# Patient Record
Sex: MALE | Race: WHITE | URBAN - METROPOLITAN AREA
[De-identification: names, ages, dates, MRNs, and addresses within clinical notes are randomized per-mention and may not be internally consistent; named-entity substitution may affect disease eponyms.]

---

## 2017-12-04 ENCOUNTER — OFFICE VISIT (OUTPATIENT)
Dept: FAMILY MEDICINE | Facility: CLINIC | Age: 57
End: 2017-12-04
Payer: COMMERCIAL

## 2017-12-04 ENCOUNTER — TELEPHONE (OUTPATIENT)
Dept: FAMILY MEDICINE | Facility: CLINIC | Age: 57
End: 2017-12-04

## 2017-12-04 VITALS
HEIGHT: 74 IN | HEART RATE: 76 BPM | BODY MASS INDEX: 23.36 KG/M2 | DIASTOLIC BLOOD PRESSURE: 78 MMHG | SYSTOLIC BLOOD PRESSURE: 116 MMHG | WEIGHT: 182 LBS

## 2017-12-04 DIAGNOSIS — K13.0 ANGULAR CHEILITIS: ICD-10-CM

## 2017-12-04 DIAGNOSIS — K13.0 ANGULAR CHEILITIS: Primary | ICD-10-CM

## 2017-12-04 PROCEDURE — 99203 OFFICE O/P NEW LOW 30 MIN: CPT | Performed by: INTERNAL MEDICINE

## 2017-12-04 RX ORDER — PRENATAL VIT 91/IRON/FOLIC/DHA 28-975-200
COMBINATION PACKAGE (EA) ORAL 2 TIMES DAILY
COMMUNITY

## 2017-12-04 RX ORDER — BENZOCAINE/MENTHOL 6 MG-10 MG
LOZENGE MUCOUS MEMBRANE 2 TIMES DAILY
COMMUNITY

## 2017-12-04 RX ORDER — TRIAMCINOLONE ACETONIDE 1 MG/G
CREAM TOPICAL
Qty: 30 G | Refills: 3 | Status: SHIPPED | OUTPATIENT
Start: 2017-12-04 | End: 2020-02-28

## 2017-12-04 RX ORDER — ZINC OXIDE
OINTMENT (GRAM) TOPICAL PRN
Qty: 30 G | Refills: 3 | Status: SHIPPED | OUTPATIENT
Start: 2017-12-04 | End: 2017-12-05

## 2017-12-04 RX ORDER — NYSTATIN 100000 U/G
CREAM TOPICAL 3 TIMES DAILY
Qty: 30 G | Refills: 3 | Status: SHIPPED | OUTPATIENT
Start: 2017-12-04 | End: 2017-12-18

## 2017-12-04 NOTE — MR AVS SNAPSHOT
"              After Visit Summary   12/4/2017    Will Dempsey    MRN: [id number]           Patient Information     Date Of Birth          1960        Visit Information        Provider Department      12/4/2017 4:40 PM Ender Turner MD [facility]        Today's Diagnoses     Angular cheilitis    -  1      Care Instructions    Mix equal parts of the zinc oxide, nystatin, and triamcinolone is a tuperware dish with lid.  Place on area 2-4 times daily.    Take the zinc supplement for 4 weeks    Call me if not improved in 3 weeks            Follow-ups after your visit        Who to contact     If you have questions or need follow up information about today's clinic visit or your schedule please contact [facility] directly at [phone].  Normal or non-critical lab and imaging results will be communicated to you by MyChart, letter or phone within 4 business days after the clinic has received the results. If you do not hear from us within 7 days, please contact the clinic through MyChart or phone. If you have a critical or abnormal lab result, we will notify you by phone as soon as possible.  Submit refill requests through [employer] or call your pharmacy and they will forward the refill request to us. Please allow 3 business days for your refill to be completed.          Additional Information About Your Visit        MyChart Information     [employer] lets you send messages to your doctor, view your test results, renew your prescriptions, schedule appointments and more. To sign up, go to [email or web address] . Click on \"Log in\" on the left side of the screen, which will take you to the Welcome page. Then click on \"Sign up Now\" on the right side of the page.     You will be asked to enter the access code listed below, as well as some personal information. Please follow the directions to create your username and password.     Your access code is: [email or web address]  Expires: " "3/4/2018  5:33 PM     Your access code will  in 90 days. If you need help or a new code, please call your [place] clinic or [phone].        Care EveryWhere ID     This is your Care EveryWhere ID. This could be used by other organizations to access your [place] medical records  [id number]        Your Vitals Were     Pulse Height BMI (Body Mass Index)             76 6' 2\" (1.88 m) 23.37 kg/m2          Blood Pressure from Last 3 Encounters:   17 116/78    Weight from Last 3 Encounters:   17 182 lb (82.6 kg)              Today, you had the following     No orders found for display         Today's Medication Changes          These changes are accurate as of: 17  5:33 PM.  If you have any questions, ask your nurse or doctor.               Start taking these medicines.        Dose/Directions    nystatin cream   Commonly known as:  MYCOSTATIN   Used for:  Angular cheilitis   Started by:  Ender Turner MD        Apply topically 3 times daily for 14 days   Quantity:  30 g   Refills:  3       triamcinolone 0.1 % cream   Commonly known as:  KENALOG   Used for:  Angular cheilitis   Started by:  Ender Turner MD        Apply sparingly to affected area three times daily as needed   Quantity:  30 g   Refills:  3       zinc oxide 40 % ointment   Commonly known as:  DESITIN   Used for:  Angular cheilitis   Started by:  Ender Turner MD        Apply topically as needed for dry skin or irritation   Quantity:  30 g   Refills:  3       Zinc-Vitamin C  MG Lozg   Used for:  Angular cheilitis   Started by:  Ender Turner MD        Dose:  1 each   Take 1 each by mouth 2 times daily   Quantity:  60 lozenge   Refills:  3            Where to get your medicines      These medications were sent to [id number] - [place], [place] - [address]  [address] [facility] [address]     Phone:  [phone]     nystatin cream    triamcinolone 0.1 % cream    zinc oxide 40 % ointment    " Zinc-Vitamin C  MG Lozg                Primary Care Provider Office Phone # Fax #    [facility] [phone]       [address] [place] [address]        Equal Access to Services     LOCO GOTTLIEB : Hadii aad ku hadsukhwinder Sojeremiah, waaxda luqadaha, qaybta kaalmada adeazaryada, shakir edwards oswaldo montelongo. So [facility] [phone].    ATENCIÓN: Si habla español, tiene a cottrell disposición servicios gratuitos de asistencia lingüística. Llame al [phone].    We comply with applicable federal civil rights laws and [place] laws. We do not discriminate on the basis of race, color, national origin, age, disability, sex, sexual orientation, or gender identity.            Thank you!     Thank you for choosing [facility]  for your care. Our goal is always to provide you with excellent care. Hearing back from our patients is one way we can continue to improve our services. Please take a few minutes to complete the written survey that you may receive in the mail after your visit with us. Thank you!             Your Updated Medication List - Protect others around you: Learn how to safely use, store and throw away your medicines at www.disposemymeds.org.          This list is accurate as of: 12/4/17  5:33 PM.  Always use your most recent med list.                   Brand Name Dispense Instructions for use Diagnosis    ATHLETES FOOT 1 % cream   Generic drug:  terbinafine      Apply topically 2 times daily    Angular cheilitis       hydrocortisone 1 % cream    CORTAID     Apply topically 2 times daily    Angular cheilitis       nystatin cream    MYCOSTATIN    30 g    Apply topically 3 times daily for 14 days    Angular cheilitis       triamcinolone 0.1 % cream    KENALOG    30 g    Apply sparingly to affected area three times daily as needed    Angular cheilitis       zinc oxide 40 % ointment    DESITIN    30 g    Apply topically as needed for  dry skin or irritation    Angular cheilitis       Zinc-Vitamin C  MG Lozg     60 lozenge    Take 1 each by mouth 2 times daily    Angular cheilitis

## 2017-12-04 NOTE — NURSING NOTE
"Chief Complaint   Patient presents with     Mouth Lesions       Initial /78 (BP Location: Right arm, Patient Position: Chair, Cuff Size: Adult Large)  Pulse 76  Ht 6' 2\" (1.88 m)  Wt 182 lb (82.6 kg)  BMI 23.37 kg/m2 Estimated body mass index is 23.37 kg/(m^2) as calculated from the following:    Height as of this encounter: 6' 2\" (1.88 m).    Weight as of this encounter: 182 lb (82.6 kg).  Medication Reconciliation: complete    "

## 2017-12-04 NOTE — TELEPHONE ENCOUNTER
Please see message below.    They also need a rewrite of the script.      Aminah Venegas RN [facility] Triage.

## 2017-12-04 NOTE — PATIENT INSTRUCTIONS
Mix equal parts of the zinc oxide, nystatin, and triamcinolone is a tuperware dish with lid.  Place on area 2-4 times daily.    Take the zinc supplement for 4 weeks    Call me if not improved in 3 weeks

## 2017-12-04 NOTE — PROGRESS NOTES
"  SUBJECTIVE:   Will Dempsey is a 57 year old male who presents to clinic today for the following health issues:    Cuts on both sides of mouth for about 1 year. [facility] and was told to use athletes foot cream and hydrocortisone cream. Pt states it has not improved much.         Problem list and histories reviewed & adjusted, as indicated.  Additional history: as documented    There is no problem list on file for this patient.    History reviewed. No pertinent surgical history.    Social History   Substance Use Topics     Smoking status: Never Smoker     Smokeless tobacco: Never Used     Alcohol use Yes      Comment: Weekends     History reviewed. No pertinent family history.      Current Outpatient Prescriptions   Medication Sig Dispense Refill     hydrocortisone (CORTAID) 1 % cream Apply topically 2 times daily       terbinafine (ATHLETES FOOT) 1 % cream Apply topically 2 times daily       triamcinolone (KENALOG) 0.1 % cream Apply sparingly to affected area three times daily as needed 30 g 3     nystatin (MYCOSTATIN) cream Apply topically 3 times daily for 14 days 30 g 3     zinc oxide (DESITIN) 40 % ointment Apply topically as needed for dry skin or irritation 30 g 3     Zinc-Vitamin C  MG LOZG Take 1 each by mouth 2 times daily 60 lozenge 3     Not on File  No lab results found.         Reviewed and updated as needed this visit by clinical staffTobacco  Allergies  Meds  Med Hx  Surg Hx  Fam Hx  Soc Hx      Reviewed and updated as needed this visit by Provider         ROS:  Constitutional, HEENT, cardiovascular, pulmonary, gi and gu systems are negative, except as otherwise noted.      OBJECTIVE:   /78 (BP Location: Right arm, Patient Position: Chair, Cuff Size: Adult Large)  Pulse 76  Ht 6' 2\" (1.88 m)  Wt 182 lb (82.6 kg)  BMI 23.37 kg/m2  Body mass index is 23.37 kg/(m^2).  GENERAL: healthy, alert and no distress  NECK: no adenopathy, no asymmetry, masses, or scars and " thyroid normal to palpation  RESP: lungs clear to auscultation - no rales, rhonchi or wheezes  CV: regular rate and rhythm, normal S1 S2, no S3 or S4, no murmur, click or rub, no peripheral edema and peripheral pulses strong  ABDOMEN: soft, nontender, no hepatosplenomegaly, no masses and bowel sounds normal  MS: no gross musculoskeletal defects noted, no edema  Angular chelitis bilateral    Diagnostic Test Results:  No results found for this or any previous visit.    ASSESSMENT/PLAN:     (K13.0) Angular cheilitis  (primary encounter diagnosis)  Comment:   Plan: hydrocortisone (CORTAID) 1 % cream, terbinafine        (ATHLETES FOOT) 1 % cream, triamcinolone         (KENALOG) 0.1 % cream, nystatin (MYCOSTATIN)         cream, zinc oxide (DESITIN) 40 % ointment,         Zinc-Vitamin C  MG LOZG          Stop the over the counter    Make paste to place over area at night  Consider diflucan oral if not improving    Follow in 3 weeks            Ender Turner MD  [facility]

## 2017-12-04 NOTE — TELEPHONE ENCOUNTER
Reason for Call:  Other prescription    Detailed comments: [facility] calling regarding patient's script for Zinc Oxide.  Pharmacy states they do not have the 30 gram dosage/size.  The Pharm has a 57 gram size.  They are wondering if it would be okay to substitute that but it would require a re-write of the script.  Please call them back on the Doc pharmacy line on the number provided below.    Phone Number Patient can be reached at: Other phone number:  [phone]    Best Time: aytime    Can we leave a detailed message on this number? YES    Call taken on 12/4/2017 at 5:49 PM by Asha Cutler

## 2017-12-05 RX ORDER — ZINC OXIDE
OINTMENT (GRAM) TOPICAL PRN
Qty: 57 G | Refills: 3 | Status: SHIPPED | OUTPATIENT
Start: 2017-12-05 | End: 2020-02-28

## 2017-12-06 ENCOUNTER — TELEPHONE (OUTPATIENT)
Dept: FAMILY MEDICINE | Facility: CLINIC | Age: 57
End: 2017-12-06

## 2017-12-07 NOTE — TELEPHONE ENCOUNTER
Called and spoke to wife - explained they can call the pharmacies of their choice to find one that has the medications and just have them transfer the prescription they need. Wife verbalizes understanding and agreement with plan.     Nurys Luna RN  [facility]

## 2017-12-07 NOTE — TELEPHONE ENCOUNTER
Reason for Call:  Medication or medication refill:    Do you use a [facility]?  Name of the pharmacy and phone number for the current request:  [facility]    Name of the medication requested: Hydrocortisone     Other request: Patient states that [facility] does not have the hydrocortisone creme in stock so patient did not get that med.  He is requesting that this script be re-sent to [facility] with the hope that they will have it.  He is anxious to get going to mix the ointments for his treatment.  If any questions, you can call his spouse, Lisa, and leave a message on her phone or talk with her.  [phone]    Can we leave a detailed message on this number? YES    Phone number patient can be reached at: Other phone number:  [phone]    Best Time: anytime    Call taken on 12/6/2017 at 6:48 PM by Asha Cutler

## 2019-02-20 ENCOUNTER — OFFICE VISIT (OUTPATIENT)
Dept: FAMILY MEDICINE | Facility: CLINIC | Age: 59
End: 2019-02-20
Payer: COMMERCIAL

## 2019-02-20 VITALS
HEIGHT: 74 IN | DIASTOLIC BLOOD PRESSURE: 75 MMHG | TEMPERATURE: 98.6 F | SYSTOLIC BLOOD PRESSURE: 120 MMHG | HEART RATE: 80 BPM | WEIGHT: 190 LBS | BODY MASS INDEX: 24.38 KG/M2 | OXYGEN SATURATION: 98 %

## 2019-02-20 DIAGNOSIS — Z12.11 SCREEN FOR COLON CANCER: ICD-10-CM

## 2019-02-20 DIAGNOSIS — Z11.59 NEED FOR HEPATITIS C SCREENING TEST: ICD-10-CM

## 2019-02-20 DIAGNOSIS — Z23 NEED FOR PROPHYLACTIC VACCINATION AND INOCULATION AGAINST INFLUENZA: ICD-10-CM

## 2019-02-20 DIAGNOSIS — Z11.4 SCREENING FOR HIV (HUMAN IMMUNODEFICIENCY VIRUS): ICD-10-CM

## 2019-02-20 DIAGNOSIS — L21.9 SEBORRHEIC DERMATITIS: Primary | ICD-10-CM

## 2019-02-20 DIAGNOSIS — B37.83 ANGULAR CHEILITIS WITH CANDIDIASIS: ICD-10-CM

## 2019-02-20 PROCEDURE — 99213 OFFICE O/P EST LOW 20 MIN: CPT | Performed by: INTERNAL MEDICINE

## 2019-02-20 RX ORDER — PREDNISONE 20 MG/1
20 TABLET ORAL DAILY
Qty: 5 TABLET | Refills: 0 | Status: SHIPPED | OUTPATIENT
Start: 2019-02-20 | End: 2019-03-04

## 2019-02-20 RX ORDER — FLUCONAZOLE 150 MG/1
150 TABLET ORAL DAILY
Qty: 3 TABLET | Refills: 0 | Status: SHIPPED | OUTPATIENT
Start: 2019-02-20 | End: 2019-03-04

## 2019-02-20 ASSESSMENT — MIFFLIN-ST. JEOR: SCORE: 1761.58

## 2019-02-20 NOTE — PROGRESS NOTES
SUBJECTIVE:   Will Dempsey is a 58 year old male who presents to clinic today for the following health issues:        Rash  Onset: 1 month     Description:   Location: forehead   Character: round, red  Itching (Pruritis): no     Progression of Symptoms:  improving    Accompanying Signs & Symptoms:  Fever: no   Body aches or joint pain: no   Sore throat symptoms: no   Recent cold symptoms: no     History:   Previous similar rash: no     Precipitating factors:   Exposure to similar rash: no   New exposures: None   Recent travel: no     Alleviating factors:  Aloe     Therapies Tried and outcome: aloe       Cuts on both sides of mouth since 201.Painful      Then starts to hurt              Problem list and histories reviewed & adjusted, as indicated.  Additional history: as documented    There is no problem list on file for this patient.    History reviewed. No pertinent surgical history.    Social History     Tobacco Use     Smoking status: Never Smoker     Smokeless tobacco: Never Used   Substance Use Topics     Alcohol use: Yes     Comment: Weekends     History reviewed. No pertinent family history.      Current Outpatient Medications   Medication Sig Dispense Refill     fluconazole (DIFLUCAN) 150 MG tablet Take 1 tablet (150 mg) by mouth daily for 3 days 3 tablet 0     predniSONE (DELTASONE) 20 MG tablet Take 20 mg by mouth daily for 5 days. 5 tablet 0     hydrocortisone (CORTAID) 1 % cream Apply topically 2 times daily       terbinafine (ATHLETES FOOT) 1 % cream Apply topically 2 times daily       triamcinolone (KENALOG) 0.1 % cream Apply sparingly to affected area three times daily as needed (Patient not taking: Reported on 2/20/2019) 30 g 3     zinc oxide (DESITIN) 40 % ointment Apply topically as needed for dry skin or irritation (Patient not taking: Reported on 2/20/2019) 57 g 3     Zinc-Vitamin C  MG LOZG Take 1 each by mouth 2 times daily (Patient not taking: Reported on 2/20/2019) 60 lozenge 3  "    Not on File  No lab results found.   BP Readings from Last 3 Encounters:   02/20/19 120/75   12/04/17 116/78    Wt Readings from Last 3 Encounters:   02/20/19 86.2 kg (190 lb)   12/04/17 82.6 kg (182 lb)                    Reviewed and updated as needed this visit by clinical staff       Reviewed and updated as needed this visit by Provider         ROS:  Constitutional, HEENT, cardiovascular, pulmonary, gi and gu systems are negative, except as otherwise noted.    OBJECTIVE:     /75   Pulse 80   Temp 98.6  F (37  C) (Oral)   Ht 1.88 m (6' 2\")   Wt 86.2 kg (190 lb)   SpO2 98%   BMI 24.39 kg/m    Body mass index is 24.39 kg/m .  GENERAL: healthy, alert and no distress  EYES: Eyes grossly normal to inspection, PERRL and conjunctivae and sclerae normal  HENT: ear canals and TM's normal, nose and mouth without ulcers or lesions  NECK: no adenopathy, no asymmetry, masses, or scars and thyroid normal to palpation  RESP: lungs clear to auscultation - no rales, rhonchi or wheezes  CV: regular rate and rhythm, normal S1 S2, no S3 or S4, no murmur, click or rub, no peripheral edema and peripheral pulses strong  ABDOMEN: soft, nontender, no hepatosplenomegaly, no masses and bowel sounds normal  MS: no gross musculoskeletal defects noted, no edema  Angular chelitis  seb clarisse on forehead    Cannot completely rule out acne rosacea      Diagnostic Test Results:  No results found for this or any previous visit.    ASSESSMENT/PLAN:       ICD-10-CM    1. Seborrheic dermatitis L21.9 fluconazole (DIFLUCAN) 150 MG tablet     predniSONE (DELTASONE) 20 MG tablet   2. Screen for colon cancer Z12.11    3. Need for hepatitis C screening test Z11.59    4. Screening for HIV (human immunodeficiency virus) Z11.4    5. Need for prophylactic vaccination and inoculation against influenza Z23    6. Angular cheilitis with candidiasis B37.0 fluconazole (DIFLUCAN) 150 MG tablet     predniSONE (DELTASONE) 20 MG tablet     If no " improvement, consider derm evaluation for acne rosacea and other derm conditions and treatment of angular chelitis    Ender Turner MD  [facility]

## 2019-02-21 ENCOUNTER — TELEPHONE (OUTPATIENT)
Dept: FAMILY MEDICINE | Facility: CLINIC | Age: 59
End: 2019-02-21

## 2019-02-21 NOTE — TELEPHONE ENCOUNTER
Reason for Call:  Other call back    Detailed comments: Patient is confused about the instructions for the Fluconazole that he was prescribed. He was sure that Dr. Turner had instructed him to take one tablet every other day, but then the instructions when he picked it up said to take one every day. Please call patient to advise which is correct. He states okay to give message to his wife if she answers the phone.    Phone Number Patient can be reached at: Other phone number:  [phone]*    Best Time: anytime    Can we leave a detailed message on this number? YES, please do!    Call taken on 2/21/2019 at 12:23 PM by Sylvain Ewing

## 2019-02-21 NOTE — TELEPHONE ENCOUNTER
Called patient and left a detailed message as requested relaying the message below. Advised to call the nurse triage line at [phone] with any questions.     Fang Freeman RN

## 2019-03-01 ENCOUNTER — TELEPHONE (OUTPATIENT)
Dept: FAMILY MEDICINE | Facility: CLINIC | Age: 59
End: 2019-03-01

## 2019-03-01 DIAGNOSIS — L21.9 SEBORRHEIC DERMATITIS: ICD-10-CM

## 2019-03-01 DIAGNOSIS — B37.83 ANGULAR CHEILITIS WITH CANDIDIASIS: ICD-10-CM

## 2019-03-01 NOTE — TELEPHONE ENCOUNTER
Patient returned call to RN line and left message for call back to him.  Brittany Hernandez RN  [facility]

## 2019-03-01 NOTE — TELEPHONE ENCOUNTER
Attempt # 1  Called patient at home number.  Was call answered?  No answer, left message to call nurse line at [phone]    Nurys Luna RN  [facility]

## 2019-03-01 NOTE — TELEPHONE ENCOUNTER
Reason for call:  Patient reporting a symptom    Symptom or request: Rash on forehead, Cuts on mouth.    Duration (how long have symptoms been present): Unknown    Have you been treated for this before? Yes    Additional comments: Pt would like a call back from nurse as he hs questions regarding his symptoms and if he need a refill on Diflucan and Prednisone.  Phone Number patient can be reached at:  Other phone number:  [phone]    Best Time:  Anytime    Can we leave a detailed message on this number:  YES    Call taken on 3/1/2019 at 12:22 PM by Edwina Segura

## 2019-03-04 RX ORDER — FLUCONAZOLE 150 MG/1
150 TABLET ORAL DAILY
Qty: 3 TABLET | Refills: 0 | Status: SHIPPED | OUTPATIENT
Start: 2019-03-04 | End: 2020-02-28

## 2019-03-04 RX ORDER — PREDNISONE 20 MG/1
20 TABLET ORAL DAILY
Qty: 5 TABLET | Refills: 0 | Status: SHIPPED | OUTPATIENT
Start: 2019-03-04 | End: 2020-02-28

## 2019-03-04 NOTE — TELEPHONE ENCOUNTER
Called patient at [phone]. Left message on voicemail to return phone call to triage line at [phone].  Aminah Venegas RN [facility] Triage.

## 2019-03-04 NOTE — TELEPHONE ENCOUNTER
I will treat again with another round, if not improved, I have also placed referral to dermatology

## 2019-03-04 NOTE — TELEPHONE ENCOUNTER
Patient returned call - took both medications - seemed to help but is still there.   No fever, no signs of infection, are a little bit better than before.       Routing to PCP to review and advise.    Please leave detailed message on wife's Cell [phone]      Nurys Luna RN  [facility]

## 2019-03-07 NOTE — TELEPHONE ENCOUNTER
Called [facility] - spoke to Zachary who states the patient picked up both medications on 3/4/2019..    Called wife's cell phone - very bad connection - did relay PCP message to patient and patient will call if not improving.    Nurys Luna RN  [facility]

## 2019-03-18 ENCOUNTER — TELEPHONE (OUTPATIENT)
Dept: FAMILY MEDICINE | Facility: CLINIC | Age: 59
End: 2019-03-18

## 2019-03-18 DIAGNOSIS — K13.0 ANGULAR CHEILITIS: ICD-10-CM

## 2019-03-18 DIAGNOSIS — L21.9 SEBORRHEIC DERMATITIS: Primary | ICD-10-CM

## 2019-03-18 NOTE — TELEPHONE ENCOUNTER
Reason for Call:  Other Referral    Detailed comments: Patient would like to schedule at [facility] dermatology, tried to call today, was told they did not have referral on file from Dr. Turner. Patient requests referral be sent again, so he is able to schedule.     Phone Number Patient can be reached at: Home number on file [phone] (home)    Best Time: any    Can we leave a detailed message on this number? YES    Call taken on 3/18/2019 at 4:48 PM by Diane Stallworth

## 2019-03-20 NOTE — TELEPHONE ENCOUNTER
Please see message below regarding receiving a new referral  [facility]  Team 3 Coordinator

## 2019-03-20 NOTE — TELEPHONE ENCOUNTER
Will is wanting to have the location changed for the referral. He would like to have it for [facility] [place]. Please change location and call patient once referral is sent to new location.   Talia Dodson,

## 2019-03-25 ENCOUNTER — TELEPHONE (OUTPATIENT)
Dept: FAMILY MEDICINE | Facility: CLINIC | Age: 59
End: 2019-03-25

## 2019-03-25 NOTE — TELEPHONE ENCOUNTER
Reason for Call:  Other - Referral    Detailed comments: Patient called stating that he needs a referral sent to [facility] in [place], [place]. He has an appointment tomorrow and they want a referral sent over before his appointment. [facility] can be reached at [phone]. Call patient with any questions.    Phone Number Patient can be reached at: Other phone number: Patient's number: [phone]    Best Time: Anytime    Can we leave a detailed message on this number? YES    Call taken on 3/25/2019 at 1:50 PM by Milton Sarah

## 2019-03-25 NOTE — TELEPHONE ENCOUNTER
Nurse sees Dermatology referral for [facility] in system needs referral to [facility] in [place]. See TE.    Routing to PCP to review and advise.    Nurys Luna RN  [facility]          .

## 2019-03-26 ENCOUNTER — TRANSFERRED RECORDS (OUTPATIENT)
Dept: HEALTH INFORMATION MANAGEMENT | Facility: CLINIC | Age: 59
End: 2019-03-26

## 2019-04-09 ENCOUNTER — TELEPHONE (OUTPATIENT)
Dept: FAMILY MEDICINE | Facility: CLINIC | Age: 59
End: 2019-04-09

## 2019-04-09 NOTE — TELEPHONE ENCOUNTER
Panel Management Review      Patient has the following on his problem list: None      Composite cancer screening  Chart review shows that this patient is due/due soon for the following Colonoscopy  Summary:    Patient is due/failing the following:   COLONOSCOPY    Action needed:   Schedule colonoscopy    Type of outreach:    Sent letter.    Questions for provider review:    Order colonoscopy                                                                                                                                    Tamie Bledsoe MA       Chart routed to Care Team .

## 2019-04-09 NOTE — LETTER
April 25, 2019    Will Dempsey  [address]  [place] [address]      Dear Will Dempsey,     We have tried to contact you about your health, but have been unable to reach you.  Please call us as soon as possible so we can provide you with the best care possible.  We will continue to check in with you throughout the year to complete these items of care, if you are not able to complete these items at this time.  If you would like to complete the missing items for your care, please contact us at [phone].    We recommend the following:  -schedule a COLONOSCOPY to look for colon cancer (due every 10 years or 5 years in higher risk situations.)   Colon cancer is now the second leading cause of death in the [place] for both men and women and there are over 130,000 new cases and 50,000 deaths per year from colon cancer.  Colonoscopies can prevent 90-95% of these deaths.  Problem lesions can be removed before they ever become cancer.  This test is not only looking for cancer, but also getting rid of precancerious lesions.  If you do not wish to do a colonoscopy or cannot afford to do one, at this time, there is another option. It is called a FIT test.        Sincerely,     Your Care Team at [place]  JPB/EN

## 2019-04-09 NOTE — LETTER
April 9, 2019    Will Dempsey  [address]  [place] [address]    Dear Will    We care about your health and have reviewed your health plan. We have reviewed your medical conditions, medication list, and lab results and are making recommendations based on this review, to better manage your health.    You are in particular need of attention regarding:  - Scheduling a Colon Cancer Screening (Colonoscopy only) [phone]      Here is a list of Health Maintenance topics that are due now or due soon:  Health Maintenance Due   Topic Date Due     PREVENTIVE CARE VISIT  1960     HIV SCREEN (SYSTEM ASSIGNED)  04/19/1980     HEPATITIS C SCREENING  04/19/1980     DTAP/TDAP/TD IMMUNIZATION (1 - Tdap) 04/19/1987     LIPID SCREEN Q5 YR MALE (SYSTEM ASSIGNED)  04/19/1997     COLON CANCER SCREEN (SYSTEM ASSIGNED)  04/19/2012     ZOSTER IMMUNIZATION (1 of 2) 04/19/2012     ADVANCE DIRECTIVE PLANNING Q5 YRS  04/19/2017     INFLUENZA VACCINE (1) 09/01/2018     We will be calling you in the next couple of weeks to help you schedule any appointments that are needed.  Please call us at [phone] (or use [employer]) to address the above recommendations.     Thank you for trusting [facility] and we appreciate the opportunity to serve you.  We look forward to supporting your healthcare needs in the future.    Healthy Regards,    Your Health Care Team  JPB/EN

## 2020-02-28 ENCOUNTER — OFFICE VISIT (OUTPATIENT)
Dept: FAMILY MEDICINE | Facility: CLINIC | Age: 60
End: 2020-02-28
Payer: COMMERCIAL

## 2020-02-28 VITALS
DIASTOLIC BLOOD PRESSURE: 87 MMHG | SYSTOLIC BLOOD PRESSURE: 153 MMHG | HEART RATE: 68 BPM | OXYGEN SATURATION: 99 % | TEMPERATURE: 98 F | WEIGHT: 188 LBS | HEIGHT: 73 IN | BODY MASS INDEX: 24.92 KG/M2

## 2020-02-28 DIAGNOSIS — I10 HTN, GOAL BELOW 140/80: ICD-10-CM

## 2020-02-28 DIAGNOSIS — Z00.00 ROUTINE GENERAL MEDICAL EXAMINATION AT A HEALTH CARE FACILITY: Primary | ICD-10-CM

## 2020-02-28 DIAGNOSIS — E78.5 HYPERLIPIDEMIA LDL GOAL <100: ICD-10-CM

## 2020-02-28 DIAGNOSIS — E55.9 VITAMIN D DEFICIENCY: ICD-10-CM

## 2020-02-28 LAB
ANION GAP SERPL CALCULATED.3IONS-SCNC: 8 MMOL/L (ref 3–14)
BUN SERPL-MCNC: 10 MG/DL (ref 7–30)
CALCIUM SERPL-MCNC: 9.6 MG/DL (ref 8.5–10.1)
CHLORIDE SERPL-SCNC: 105 MMOL/L (ref 94–109)
CHOLEST SERPL-MCNC: 253 MG/DL
CO2 SERPL-SCNC: 25 MMOL/L (ref 20–32)
CREAT SERPL-MCNC: 0.81 MG/DL (ref 0.66–1.25)
GFR SERPL CREATININE-BSD FRML MDRD: >90 ML/MIN/{1.73_M2}
GLUCOSE SERPL-MCNC: 101 MG/DL (ref 70–99)
HDLC SERPL-MCNC: 45 MG/DL
LDLC SERPL CALC-MCNC: 181 MG/DL
NONHDLC SERPL-MCNC: 208 MG/DL
POTASSIUM SERPL-SCNC: 4 MMOL/L (ref 3.4–5.3)
SODIUM SERPL-SCNC: 138 MMOL/L (ref 133–144)
TRIGL SERPL-MCNC: 136 MG/DL
TSH SERPL DL<=0.005 MIU/L-ACNC: 1.03 MU/L (ref 0.4–4)

## 2020-02-28 PROCEDURE — 36415 COLL VENOUS BLD VENIPUNCTURE: CPT | Performed by: INTERNAL MEDICINE

## 2020-02-28 PROCEDURE — 99396 PREV VISIT EST AGE 40-64: CPT | Performed by: INTERNAL MEDICINE

## 2020-02-28 PROCEDURE — 80048 BASIC METABOLIC PNL TOTAL CA: CPT | Performed by: INTERNAL MEDICINE

## 2020-02-28 PROCEDURE — 80061 LIPID PANEL: CPT | Performed by: INTERNAL MEDICINE

## 2020-02-28 PROCEDURE — 84443 ASSAY THYROID STIM HORMONE: CPT | Performed by: INTERNAL MEDICINE

## 2020-02-28 PROCEDURE — 82306 VITAMIN D 25 HYDROXY: CPT | Performed by: INTERNAL MEDICINE

## 2020-02-28 RX ORDER — LISINOPRIL 10 MG/1
10 TABLET ORAL DAILY
Qty: 90 TABLET | Refills: 3 | Status: SHIPPED | OUTPATIENT
Start: 2020-02-28

## 2020-02-28 ASSESSMENT — MIFFLIN-ST. JEOR: SCORE: 1735.25

## 2020-02-28 NOTE — PATIENT INSTRUCTIONS
Preventive Health Recommendations  Male Ages 50 - 64    Yearly exam:             See your health care provider every year in order to  o   Review health changes.   o   Discuss preventive care.    o   Review your medicines if your doctor has prescribed any.     Have a cholesterol test every 5 years, or more frequently if you are at risk for high cholesterol/heart disease.     Have a diabetes test (fasting glucose) every three years. If you are at risk for diabetes, you should have this test more often.     Have a colonoscopy at age 50, or have a yearly FIT test (stool test). These exams will check for colon cancer.      Talk with your health care provider about whether or not a prostate cancer screening test (PSA) is right for you.    You should be tested each year for STDs (sexually transmitted diseases), if you re at risk.     Shots: Get a flu shot each year. Get a tetanus shot every 10 years.     Nutrition:    Eat at least 5 servings of fruits and vegetables daily.     Eat whole-grain bread, whole-wheat pasta and brown rice instead of white grains and rice.     Get adequate Calcium and Vitamin D.     Lifestyle    Exercise for at least 150 minutes a week (30 minutes a day, 5 days a week). This will help you control your weight and prevent disease.     Limit alcohol to one drink per day.     No smoking.     Wear sunscreen to prevent skin cancer.     See your dentist every six months for an exam and cleaning.     See your eye doctor every 1 to 2 years.    Goal of 170#

## 2020-02-28 NOTE — PROGRESS NOTES
3  SUBJECTIVE:   CC: Will Dempsey is an 59 year old male who presents for preventive health visit.     Healthy Habits:    Do you get at least three servings of calcium containing foods daily (dairy, green leafy vegetables, etc.)? yes    Amount of exercise or daily activities, outside of work: 4 day(s) per week    Problems taking medications regularly No    Medication side effects: No    Have you had an eye exam in the past two years? no    Do you see a dentist twice per year? no    Do you have sleep apnea, excessive snoring or daytime drowsiness?no    BP has been elevated. Has been taking it at the store ranging from 160's-150's/90's-112.    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   Mostly sugary.    Does the deliveries  Exercise.  Protein shake.          Today's PHQ-2 Score:   PHQ-2 ( 1999 [employer]) 2/28/2020 2/20/2019   Q1: Little interest or pleasure in doing things 0 0   Q2: Feeling down, depressed or hopeless 0 0   PHQ-2 Score 0 0       Abuse: Current or Past(Physical, Sexual or Emotional)- No  Do you feel safe in your environment? Yes    Have you ever done Advance Care Planning? (For example, a Health Directive, POLST, or a discussion with a medical provider or your loved ones about your wishes): Unsure, but declined information at this time.    Social History     Tobacco Use     Smoking status: Never Smoker     Smokeless tobacco: Never Used   Substance Use Topics     Alcohol use: Yes     Comment: Weekends     If you drink alcohol do you typically have >3 drinks per day or >7 drinks per week? Yes - AUDIT SCORE:                         Last PSA: No results found for: PSA    Reviewed orders with patient. Reviewed health maintenance and updated orders accordingly - Yes  Lab work is in process  Labs reviewed in EPIC  BP Readings from Last 3 Encounters:   02/28/20 (!) 153/87   02/20/19 120/75   12/04/17 116/78    Wt Readings from Last 3 Encounters:   02/28/20  85.3 kg (188 lb)   02/20/19 86.2 kg (190 lb)   12/04/17 82.6 kg (182 lb)                  There is no problem list on file for this patient.    History reviewed. No pertinent surgical history.    Social History     Tobacco Use     Smoking status: Never Smoker     Smokeless tobacco: Never Used   Substance Use Topics     Alcohol use: Yes     Comment: Weekends     History reviewed. No pertinent family history.      Current Outpatient Medications   Medication Sig Dispense Refill     lisinopril (ZESTRIL) 10 MG tablet Take 1 tablet (10 mg) by mouth daily 90 tablet 3     hydrocortisone (CORTAID) 1 % cream Apply topically 2 times daily       terbinafine (ATHLETES FOOT) 1 % cream Apply topically 2 times daily       No Known Allergies  Recent Labs   Lab Test 02/28/20  0919   *   HDL 45   TRIG 136   CR 0.81   GFRESTIMATED >90   GFRESTBLACK >90   POTASSIUM 4.0   TSH 1.03        Reviewed and updated as needed this visit by clinical staff  Tobacco  Allergies  Meds  Med Hx  Surg Hx  Fam Hx  Soc Hx        Reviewed and updated as needed this visit by Provider            ROS:  CONSTITUTIONAL: NEGATIVE for fever, chills, change in weight  INTEGUMENTARY/SKIN: NEGATIVE for worrisome rashes, moles or lesions  EYES: NEGATIVE for vision changes or irritation  ENT: NEGATIVE for ear, mouth and throat problems  RESP: NEGATIVE for significant cough or SOB  CV: NEGATIVE for chest pain, palpitations or peripheral edema  GI: NEGATIVE for nausea, abdominal pain, heartburn, or change in bowel habits   male: negative for dysuria, hematuria, decreased urinary stream, erectile dysfunction, urethral discharge  MUSCULOSKELETAL: NEGATIVE for significant arthralgias or myalgia  NEURO: NEGATIVE for weakness, dizziness or paresthesias  PSYCHIATRIC: NEGATIVE for changes in mood or affect    OBJECTIVE:   BP (!) 153/87 (BP Location: Right arm, Patient Position: Chair, Cuff Size: Adult Regular)   Pulse 68   Temp 98  F (36.7  C) (Oral)   Ht  "1.86 m (6' 1.23\")   Wt 85.3 kg (188 lb)   SpO2 99%   BMI 24.65 kg/m    EXAM:  GENERAL: healthy, alert and no distress  EYES: Eyes grossly normal to inspection, PERRL and conjunctivae and sclerae normal  HENT: ear canals and TM's normal, nose and mouth without ulcers or lesions  NECK: no adenopathy, no asymmetry, masses, or scars and thyroid normal to palpation  RESP: lungs clear to auscultation - no rales, rhonchi or wheezes  CV: regular rate and rhythm, normal S1 S2, no S3 or S4, no murmur, click or rub, no peripheral edema and peripheral pulses strong  ABDOMEN: soft, nontender, no hepatosplenomegaly, no masses and bowel sounds normal  MS: no gross musculoskeletal defects noted, no edema  SKIN: no suspicious lesions or rashes  NEURO: Normal strength and tone, mentation intact and speech normal  BACK: no CVA tenderness, no paralumbar tenderness  PSYCH: mentation appears normal, affect normal/bright  LYMPH: no cervical, supraclavicular, axillary, or inguinal adenopathy    Diagnostic Test Results:  Labs reviewed in Epic  Results for orders placed or performed in visit on 02/28/20   Lipid panel reflex to direct LDL Fasting     Status: Abnormal   Result Value Ref Range    Cholesterol 253 (H) <200 mg/dL    Triglycerides 136 <150 mg/dL    HDL Cholesterol 45 >39 mg/dL    LDL Cholesterol Calculated 181 (H) <100 mg/dL    Non HDL Cholesterol 208 (H) <130 mg/dL   **Basic metabolic panel FUTURE 1yr     Status: Abnormal   Result Value Ref Range    Sodium 138 133 - 144 mmol/L    Potassium 4.0 3.4 - 5.3 mmol/L    Chloride 105 94 - 109 mmol/L    Carbon Dioxide 25 20 - 32 mmol/L    Anion Gap 8 3 - 14 mmol/L    Glucose 101 (H) 70 - 99 mg/dL    Urea Nitrogen 10 7 - 30 mg/dL    Creatinine 0.81 0.66 - 1.25 mg/dL    GFR Estimate >90 >60 mL/min/[1.73_m2]    GFR Estimate If Black >90 >60 mL/min/[1.73_m2]    Calcium 9.6 8.5 - 10.1 mg/dL   TSH with free T4 reflex     Status: None   Result Value Ref Range    TSH 1.03 0.40 - 4.00 mU/L " "  Vitamin D Deficiency     Status: None   Result Value Ref Range    Vitamin D Deficiency screening 27 20 - 75 ug/L       ASSESSMENT/PLAN:       ICD-10-CM    1. Routine general medical examination at a health care facility Z00.00    2. HTN, goal below 140/80 I10 lisinopril (ZESTRIL) 10 MG tablet     **Basic metabolic panel FUTURE 1yr     TSH with free T4 reflex     CANCELED: Basic metabolic panel     CANCELED: TSH with free T4 reflex   3. Hyperlipidemia LDL goal <100 E78.5 Lipid panel reflex to direct LDL Fasting     CANCELED: Lipid panel reflex to direct LDL Fasting   4. Vitamin D deficiency E55.9 Vitamin D Deficiency     CANCELED: Vitamin D Deficiency       COUNSELING:  Reviewed preventive health counseling, as reflected in patient instructions       Regular exercise       Healthy diet/nutrition       Vision screening       Hearing screening       Family planning       Colon cancer screening       Prostate cancer screening    Estimated body mass index is 24.65 kg/m  as calculated from the following:    Height as of this encounter: 1.86 m (6' 1.23\").    Weight as of this encounter: 85.3 kg (188 lb).    Weight management plan: Discussed healthy diet and exercise guidelines     reports that he has never smoked. He has never used smokeless tobacco.      ICD-10-CM    1. Routine general medical examination at a health care facility Z00.00    2. HTN, goal below 140/80 I10 lisinopril (ZESTRIL) 10 MG tablet     **Basic metabolic panel FUTURE 1yr     TSH with free T4 reflex     CANCELED: Basic metabolic panel     CANCELED: TSH with free T4 reflex   3. Hyperlipidemia LDL goal <100 E78.5 Lipid panel reflex to direct LDL Fasting     CANCELED: Lipid panel reflex to direct LDL Fasting   4. Vitamin D deficiency E55.9 Vitamin D Deficiency     CANCELED: Vitamin D Deficiency         Counseling Resources:  ATP IV Guidelines  Pooled Cohorts Equation Calculator  FRAX Risk Assessment  ICSI Preventive Guidelines  Dietary Guidelines for " Americans, 2010  USDA's MyPlate  ASA Prophylaxis  Lung CA Screening    Ender Turner MD  [facility]

## 2020-03-01 LAB — DEPRECATED CALCIDIOL+CALCIFEROL SERPL-MC: 27 UG/L (ref 20–75)

## 2020-03-10 RX ORDER — ATORVASTATIN CALCIUM 10 MG/1
10 TABLET, FILM COATED ORAL DAILY
Qty: 90 TABLET | Refills: 3 | Status: SHIPPED | OUTPATIENT
Start: 2020-03-10

## 2020-04-06 ENCOUNTER — E-VISIT (OUTPATIENT)
Dept: FAMILY MEDICINE | Facility: CLINIC | Age: 60
End: 2020-04-06
Payer: COMMERCIAL

## 2020-04-06 DIAGNOSIS — N50.819 TESTICULAR PAIN: Primary | ICD-10-CM

## 2020-04-06 PROCEDURE — 99422 OL DIG E/M SVC 11-20 MIN: CPT | Performed by: INTERNAL MEDICINE

## 2020-04-07 NOTE — TELEPHONE ENCOUNTER
I called patient  Left sided  Hurts more when sitting  No trauma  Urinating fine  No illness symptoms      I called and discussed in detail with patient    Likely muscular flank/ back pain    Call/ follow up if not resolving    Get back to exercise as able    If the scrotal pain not improving in next few weeks then prudent to do ultrasound    I put in order    Patient can call and schedule if needed    Patient agreed with plan    Call sooner if needed           Provider E-Visit time total (minutes): 14 (8:19 pm to 8:33 pm )

## 2020-12-12 ENCOUNTER — HEALTH MAINTENANCE LETTER (OUTPATIENT)
Age: 60
End: 2020-12-12

## 2021-04-11 ENCOUNTER — HEALTH MAINTENANCE LETTER (OUTPATIENT)
Age: 61
End: 2021-04-11

## 2021-09-26 ENCOUNTER — HEALTH MAINTENANCE LETTER (OUTPATIENT)
Age: 61
End: 2021-09-26

## 2022-05-08 ENCOUNTER — HEALTH MAINTENANCE LETTER (OUTPATIENT)
Age: 62
End: 2022-05-08

## 2023-01-08 ENCOUNTER — HEALTH MAINTENANCE LETTER (OUTPATIENT)
Age: 63
End: 2023-01-08

## 2023-06-02 ENCOUNTER — HEALTH MAINTENANCE LETTER (OUTPATIENT)
Age: 63
End: 2023-06-02